# Patient Record
Sex: FEMALE | URBAN - METROPOLITAN AREA
[De-identification: names, ages, dates, MRNs, and addresses within clinical notes are randomized per-mention and may not be internally consistent; named-entity substitution may affect disease eponyms.]

---

## 2020-07-15 ENCOUNTER — WEB ENCOUNTER (OUTPATIENT)
Dept: URBAN - METROPOLITAN AREA CLINIC 13 | Facility: CLINIC | Age: 20
End: 2020-07-15

## 2020-07-16 ENCOUNTER — DASHBOARD ENCOUNTERS (OUTPATIENT)
Age: 20
End: 2020-07-16

## 2020-07-16 ENCOUNTER — WEB ENCOUNTER (OUTPATIENT)
Dept: URBAN - METROPOLITAN AREA CLINIC 92 | Facility: CLINIC | Age: 20
End: 2020-07-16

## 2020-07-16 ENCOUNTER — OFFICE VISIT (OUTPATIENT)
Dept: URBAN - METROPOLITAN AREA CLINIC 92 | Facility: CLINIC | Age: 20
End: 2020-07-16
Payer: COMMERCIAL

## 2020-07-16 VITALS
TEMPERATURE: 97.7 F | HEART RATE: 67 BPM | SYSTOLIC BLOOD PRESSURE: 135 MMHG | HEIGHT: 60 IN | BODY MASS INDEX: 28.7 KG/M2 | WEIGHT: 146.2 LBS | DIASTOLIC BLOOD PRESSURE: 92 MMHG

## 2020-07-16 DIAGNOSIS — K59.09 CHRONIC CONSTIPATION: ICD-10-CM

## 2020-07-16 DIAGNOSIS — K64.4 EXTERNAL HEMORRHOID: ICD-10-CM

## 2020-07-16 DIAGNOSIS — K59.00 CONSTIPATION, UNSPECIFIED CONSTIPATION TYPE: ICD-10-CM

## 2020-07-16 PROBLEM — 23913003: Status: ACTIVE | Noted: 2020-07-16

## 2020-07-16 PROBLEM — 14760008: Status: ACTIVE | Noted: 2020-07-16

## 2020-07-16 PROCEDURE — 99243 OFF/OP CNSLTJ NEW/EST LOW 30: CPT | Performed by: PHYSICIAN ASSISTANT

## 2020-07-16 PROCEDURE — 99203 OFFICE O/P NEW LOW 30 MIN: CPT | Performed by: PHYSICIAN ASSISTANT

## 2020-07-16 RX ORDER — NORETHINDRONE ACETATE AND ETHINYL ESTRADIOL, ETHINYL ESTRADIOL AND FERROUS FUMARATE 1MG-10(24)
1 TABLET KIT ORAL ONCE A DAY
Status: ACTIVE | COMMUNITY

## 2020-07-16 NOTE — HPI-TODAY'S VISIT:
20yoF presents for eval of hemorroids from Dr. Lozada. Noticed something in the rectal area in Feb, saw PCP and told had hemorroid and given cream for this. The cream helps to decrease the swelling but does "rid the bump" No pain but continues to feel the hemorrhoid and occasionally has BRB with BM. Bowel movement every 1-2 days, some hard stools with straining. No abd pain, anorexia, weight loss. No GERD.